# Patient Record
Sex: FEMALE | Race: WHITE | NOT HISPANIC OR LATINO | Employment: OTHER | URBAN - METROPOLITAN AREA
[De-identification: names, ages, dates, MRNs, and addresses within clinical notes are randomized per-mention and may not be internally consistent; named-entity substitution may affect disease eponyms.]

---

## 2021-07-27 ENCOUNTER — TELEPHONE (OUTPATIENT)
Dept: OTHER | Facility: OTHER | Age: 81
End: 2021-07-27

## 2021-07-28 ENCOUNTER — NURSING HOME VISIT (OUTPATIENT)
Dept: GERIATRICS | Facility: OTHER | Age: 81
End: 2021-07-28
Payer: COMMERCIAL

## 2021-07-28 DIAGNOSIS — I10 ESSENTIAL HYPERTENSION: ICD-10-CM

## 2021-07-28 DIAGNOSIS — M25.562 ARTHRALGIA OF LEFT KNEE: ICD-10-CM

## 2021-07-28 DIAGNOSIS — E03.9 ACQUIRED HYPOTHYROIDISM: ICD-10-CM

## 2021-07-28 DIAGNOSIS — L89.623 PRESSURE INJURY OF LEFT HEEL, STAGE 3 (HCC): ICD-10-CM

## 2021-07-28 DIAGNOSIS — D50.8 OTHER IRON DEFICIENCY ANEMIA: ICD-10-CM

## 2021-07-28 DIAGNOSIS — E44.0 PROTEIN-CALORIE MALNUTRITION, MODERATE (HCC): ICD-10-CM

## 2021-07-28 DIAGNOSIS — R26.2 AMBULATORY DYSFUNCTION: Primary | ICD-10-CM

## 2021-07-28 PROBLEM — D50.9 IRON DEFICIENCY ANEMIA: Status: ACTIVE | Noted: 2021-06-21

## 2021-07-28 PROBLEM — I21.4 NSTEMI (NON-ST ELEVATED MYOCARDIAL INFARCTION) (HCC): Status: ACTIVE | Noted: 2021-07-21

## 2021-07-28 PROBLEM — I50.20 HFREF (HEART FAILURE WITH REDUCED EJECTION FRACTION) (HCC): Status: ACTIVE | Noted: 2021-07-22

## 2021-07-28 PROBLEM — R74.01 ELEVATED AST (SGOT): Status: ACTIVE | Noted: 2021-07-21

## 2021-07-28 PROBLEM — Z13.220 LIPID SCREENING: Status: ACTIVE | Noted: 2018-06-07

## 2021-07-28 PROBLEM — I83.90 VARICOSE VEIN OF LEG: Status: ACTIVE | Noted: 2018-06-07

## 2021-07-28 PROBLEM — R77.8 ELEVATED TROPONIN: Status: ACTIVE | Noted: 2021-06-15

## 2021-07-28 PROBLEM — Z71.1 CONCERN ABOUT EAR DISEASE WITHOUT DIAGNOSIS: Status: ACTIVE | Noted: 2018-06-07

## 2021-07-28 PROBLEM — M25.50 ARTHRALGIA: Status: ACTIVE | Noted: 2018-06-07

## 2021-07-28 PROBLEM — I5A MYOCARDIAL INJURY: Status: ACTIVE | Noted: 2021-07-21

## 2021-07-28 PROBLEM — I31.3 PERICARDIAL EFFUSION: Status: ACTIVE | Noted: 2021-06-17

## 2021-07-28 PROBLEM — K21.9 GASTROESOPHAGEAL REFLUX DISEASE WITHOUT ESOPHAGITIS: Status: ACTIVE | Noted: 2018-06-07

## 2021-07-28 PROBLEM — M62.82 DISEASE CHARACTERIZED BY DESTRUCTION OF SKELETAL MUSCLE: Status: ACTIVE | Noted: 2021-07-21

## 2021-07-28 PROBLEM — D64.9 NORMOCYTIC ANEMIA: Status: ACTIVE | Noted: 2021-06-15

## 2021-07-28 PROBLEM — G93.41 ACUTE METABOLIC ENCEPHALOPATHY: Status: ACTIVE | Noted: 2021-07-26

## 2021-07-28 PROBLEM — E55.9 VITAMIN D DEFICIENCY: Status: ACTIVE | Noted: 2021-06-21

## 2021-07-28 PROBLEM — N17.9 ACUTE KIDNEY INJURY (HCC): Status: ACTIVE | Noted: 2021-06-21

## 2021-07-28 PROCEDURE — 99306 1ST NF CARE HIGH MDM 50: CPT | Performed by: FAMILY MEDICINE

## 2021-07-28 RX ORDER — PANTOPRAZOLE SODIUM 40 MG/1
TABLET, DELAYED RELEASE ORAL
COMMUNITY
Start: 2021-06-21

## 2021-07-28 RX ORDER — LEVOTHYROXINE SODIUM 0.05 MG/1
50 TABLET ORAL DAILY
COMMUNITY
Start: 2021-06-22

## 2021-07-28 RX ORDER — LOSARTAN POTASSIUM 50 MG/1
50 TABLET ORAL DAILY
COMMUNITY
Start: 2021-06-21

## 2021-07-28 RX ORDER — METOPROLOL SUCCINATE 25 MG/1
25 TABLET, EXTENDED RELEASE ORAL DAILY
COMMUNITY
Start: 2021-07-27

## 2021-07-28 RX ORDER — ASPIRIN 81 MG/1
81 TABLET, CHEWABLE ORAL DAILY
COMMUNITY
Start: 2021-07-28 | End: 2022-07-28

## 2021-07-28 NOTE — ASSESSMENT & PLAN NOTE
With recent fall and rhabdo, needs NH Care, ordered PT/OT to improve gait, transfer, endurance, ADLs

## 2021-07-28 NOTE — PROGRESS NOTES
[address]  [address]  Facility: [facility]  NAME: Carlos A Alba  AGE: 80 y o  SEX: female    DATE OF ENCOUNTER: 7/28/2021    Code status:  No CPR D/W patient and her daughter    Assessment and Plan     Ambulatory dysfunction  With recent fall and rhabdo, needs NH Care, ordered PT/OT to improve gait, transfer, endurance, ADLs  Arthralgia  Ordered Bengay, close monitoring, tylenol prn, muscle strengthening exercises  Iron deficiency anemia  On Iron, ordered CBC on 7/30/2021  Essential hypertension  Ordered las for 7/30  On Losartan  Will continue with monitoring  Zio patch in place to be remove and send to cardio on 8/6  Hypothyroidism  On Levothyroxine  Last TSH in June slightly at higher side  Will continue with monitoring  Protein-calorie malnutrition, moderate ([facility] [address] )  Will monitor closely  Pressure injury of left heel, stage 3 (HCC)  Mepliex to left heel  Wound solution to f/u pt in the facility  All medications and routine orders were reviewed and updated as needed  Plan discussed with: pt, her daughter and nursing staff     Chief Complaint     Left knee and shoulders pain    History of Present Illness   Pt with [facility] and medical problem as this note who was admitted to [facility] on 7/20 after she had a fall at home and was on floor for several hours till her daughter came back from work and found her  Pt had another admission to the hospital 6/15-6/21 for LINDSAY requiring 2 units PRBC transfusion, and pt refused GI w/u  Pt also was found with small pericardial effusion with tamponade physiology, conservative management was pursed and repeat echo did not show tamponade physiology  Pt also had VIPUL along with elevated Troponin in the setting of hypertensive urgency which improved, EKG was negative for ischemic changes  Pt had rhabdomyolysis and was treated with IV fluid  CK were initially 1357 and was rending down   Pt reports mostly left knee pain and some b/l shoulder and neck pain  ??? Lightheadedness  Pt is now at [facility] since yesterday for STR  Pt has Zio patch cardiac monitoring on  HISTORY:  Past Medical History:   Diagnosis Date    Gastroesophageal reflux disease without esophagitis     HTN (hypertension)     Hypothyroidism      Family History   Family history unknown: Yes     Social History     Socioeconomic History    Marital status: Unknown     Spouse name: None    Number of children: None    Years of education: None    Highest education level: None   Occupational History    None   Tobacco Use    Smoking status: Never Smoker    Smokeless tobacco: Never Used   Substance and Sexual Activity    Alcohol use: Never    Drug use: Never    Sexual activity: Not Currently   Other Topics Concern    None   Social History Narrative    None     Social Determinants of Health     Financial Resource Strain:     Difficulty of Paying Living Expenses:    Food Insecurity:     Worried About Running Out of Food in the Last Year:     Ran Out of Food in the Last Year:    Transportation Needs:     Lack of Transportation (Medical):  Lack of Transportation (Non-Medical):    Physical Activity:     Days of Exercise per Week:     Minutes of Exercise per Session:    Stress:     Feeling of Stress :    Social Connections:     Frequency of Communication with Friends and Family:     Frequency of Social Gatherings with Friends and Family:     Attends Orthodoxy Services:     Active Member of Clubs or Organizations:     Attends Club or Organization Meetings:     Marital Status:    Intimate Partner Violence:     Fear of Current or Ex-Partner:     Emotionally Abused:     Physically Abused:     Sexually Abused: Allergies:  No Known Allergies    Review of Systems     Review of Systems   Constitutional: Negative  HENT: Negative  Eyes: Negative  Respiratory: Negative  Cardiovascular: Negative      Gastrointestinal: Negative  Endocrine: Negative  Genitourinary: Negative  Musculoskeletal: Negative  Left knee pain, B/L shoulder/neck soreness  Skin: Negative  Allergic/Immunologic: Negative  Neurological: Negative  Hematological: Negative  Psychiatric/Behavioral: Negative  All other systems reviewed and are negative  Medications and orders     All medications reviewed and updated in Nursing Home EMR  Objective     Vitals: wt:114 5Ibs           BP:118/62          PA:70   RR:18      Afebrile     Physical Exam  Vitals and nursing note reviewed  Constitutional:       General: She is not in acute distress  Appearance: Normal appearance  She is well-developed  She is not ill-appearing, toxic-appearing or diaphoretic  HENT:      Head: Normocephalic and atraumatic  Right Ear: External ear normal       Left Ear: External ear normal       Nose: Nose normal  No congestion or rhinorrhea  Mouth/Throat:      Comments: Missing teeth   Eyes:      General: No scleral icterus  Right eye: No discharge  Left eye: No discharge  Conjunctiva/sclera: Conjunctivae normal       Pupils: Pupils are equal, round, and reactive to light  Cardiovascular:      Rate and Rhythm: Normal rate and regular rhythm  Heart sounds: Normal heart sounds  No murmur heard  No friction rub  No gallop  Pulmonary:      Effort: Pulmonary effort is normal  No respiratory distress  Breath sounds: Normal breath sounds  No stridor  No wheezing, rhonchi or rales  Chest:      Chest wall: No tenderness  Abdominal:      General: Abdomen is flat  Bowel sounds are normal  There is no distension  Palpations: Abdomen is soft  There is no mass  Tenderness: There is no abdominal tenderness  There is no guarding or rebound  Hernia: No hernia is present  Genitourinary:     Comments: Deferred  Musculoskeletal:         General: No swelling, tenderness or deformity   Normal range of motion  Cervical back: Normal range of motion and neck supple  No rigidity or tenderness  Right lower leg: No edema  Lymphadenopathy:      Cervical: No cervical adenopathy  Skin:     General: Skin is warm and dry  Coloration: Skin is not jaundiced or pale  Findings: Erythema (left post/lateral heel has open area with some drainage ) present  No bruising, lesion or rash  Comments: Didn't examine sacral area  Neurological:      Mental Status: She is alert  Cranial Nerves: Cranial nerve deficit present  Psychiatric:         Behavior: Behavior normal          Pertinent Laboratory/Diagnostic Studies: The following labs/studies were reviewed please see chart or hospital paperwork for details     Ref Range & Units 2 d ago Comments   SARS Coronavirus 2 PCR Not Detected  Not Detected       Ref Range & Units 2 d ago    Glucose 65 - 99 mg/dL 94        BUN 7 - 25 mg/dL 38High         Creatinine 0 40 - 1 10 mg/dL 0 86        Sodium 135 - 145 mmol/L 141        Potassium 3 5 - 5 2 mmol/L 4 6        Chloride 100 - 109 mmol/L 111High         Carbon Dioxide 23 - 31 mmol/L 24        Calcium 8 5 - 10 1 mg/dL 8 7        Anion Gap 3 - 11  6        eGFR, Non- >60  64        eGFR,  >60  74        eGFR Comment   Please refer to initial GFR, CALC footnote           Ref Range & Units 2 d ago   Hemoglobin 11 5 - 14 5 g/dL 9 6Low         Hematocrit 35 0 - 43 0 % 29 5Low         WBC 4 0 - 10 0 thou/cmm 3 9Low         RBC 3 70 - 4 70 mill/cmm 3 45Low         Platelet Count 144 - 350 thou/cmm 233        MPV 7 5 - 11 3 fL 8 5        MCV 80 - 100 fL 86        MCH 26 0 - 34 0 pg 27 7        MCHC 32 0 - 37 0 g/dL 32 3        RDW 12 0 - 16 0 % 35 6High         Resulting Agency  [facility] ([facility])   Specimen Collected: 07/26/21  3:25 AM Last Resulted: 07/26/21  4:26 AM   Received From: [facility] [address]  Result Received: 07/28/21  4:16 PM        Ref Range & Units 5 d ago Comments    Cholesterol <200 mg/dL 223High           Triglyceride <150 mg/dL 121          Cholesterol, HDL, Direct >40 mg/dL 59          Cholesterol, Non-HDL <160 mg/dL 164High   Note: For NCEP interpretive guidelines please refer to the Laboratory Handbook  Cholesterol, LDL, Calculated <130 mg/dL 140High   LDL Cholesterol was calculated using the Friedewald equation  Direct measurement of LDL is not indicated for this patient based on [facility]'s analytical algorithm for measurement of LDL Cholesterol  CHOL/HDL Ratio   3 78  Relative Risk   1/2 Average Risk          3 27   Average Risk              4 44   2X Average Risk           7 05   3X Average Risk          11 04      07/23/21 07/22/21 07/21/21 07/20/21   CK, Total 178 287High  804High  1357High      Formatting of this note might be different from the original    Left knee x-ray 3 view     History: Pain poor ambulation no provided history of trauma     Comparison: None     Findings: No acute fracture, dislocation or bony destructive pathology  Slight   chronic osteoarthritis left knee  The bones are moderately demineralized   osteopenia/osteoporosis  Mild hazy joint effusion patella bursa  ASVD left leg   incidentally noted  IMPRESSION:   IMPRESSION:       Slight chronic osteoarthritis left knee  Osteoporosis  Mild joint effusion  CT left lower extremity unenhanced     INDICATION: Hip pain initial exam     PROCEDURE: Helical sections through the left hip with multiplanar reformats     COMPARISON: CT 2 days earlier     FINDINGS: No fracture or signs of osteonecrosis or significant arthritis of the   hip  Satisfactory muscular volume for the patient's chronologic age  Mild   anasarca  Mild vascular calcification  Distended urinary bladder  No evidence of   hernia    Other Result Text    Jacky Vega MD - 07/23/2021   Formatting of this note might be different from the original    CT left lower extremity unenhanced INDICATION: Hip pain initial exam     PROCEDURE: Helical sections through the left hip with multiplanar reformats     COMPARISON: CT 2 days earlier     FINDINGS: No fracture or signs of osteonecrosis or significant arthritis of the   hip  Satisfactory muscular volume for the patient's chronologic age  Mild   anasarca  Mild vascular calcification  Distended urinary bladder  No evidence of   hernia  IMPRESSION:   IMPRESSION: Unremarkable left hip  CT examination performed with dose lowering protocol in accordance with ALARA  Ref Range & Units 6 d ago   CK, Total <201 U/L 287High             Ref Range & Units 7 d ago   CK, Total <201 U/L 804High        Ref Range & Units 7 d ago   Thyroid Stimulating Hormone 0 36 - 3 74 uIU/mL 1 42       Left Ventricle: Systolic function is moderately decreased with an   ejection fraction of 35-40%  Severe hypokinesis of the mid to distal   anteroseptum, inferoseptum, apical segments    Left Atrium: Left atrium cavity is mildly dilated    Right Ventricle: Right ventricle cavity is normal  Systolic function is   normal      Pericardium: There is a moderate circumferential pericardial effusion   anterior and posterior to the heart  There is no echocardiographic   evidence of tamponade  Ref Range & Units 7 d ago    Troponin I <0 05 ng/mL 5  71High Panic          Ref Range & Units 8 d ago   Troponin I <0 05 ng/mL 6  60High Panic      Ref Range & Units 8 d ago    Troponin I <0 05 ng/mL 3  02High Panic       CT chest, abdomen, and pelvis with contrast   Impression:   1  No acute posttraumatic injury in the chest, abdomen or pelvis is identified  2  Pericardial effusion  Trace pleural effusions  3  Colonic fecal loading suggests constipation  Additional findings as detailed   above  NO CHARGE CT LUMBAR SPINE    Impression    IMPRESSION:   1  Thoracolumbar kyphoscoliosis  2  Multiple age-indeterminate compression fractures  3  Spondylosis         NO CHARGE CT THORACIC SPINE    Impression    IMPRESSION:   1  Numerous age-indeterminate compression fractures  2  No bony retropulsion  3  Spondylosis 4  Kyphoscoliosis  CT HEAD WO CONTRAST    Impression    Impression:   1  Atrophy  2  Cerebral white matter disease - non-specific but probably due to age-related   and/or chronic small vessel ischemic disease  CT CERVICAL SPINE WO CONTRAST    Impression    Impression: 1  Spondylosis  2  No fracture or dislocation  CT examination performed with dose lowering protocol in accordance with ALARA  CT left lower extremity unenhanced    INDICATION: Hip pain initial exam    PROCEDURE: Helical sections through the left hip with multiplanar reformats    COMPARISON: CT 2 days earlier    FINDINGS: No fracture or signs of osteonecrosis or significant arthritis of the  hip  Satisfactory muscular volume for the patient's chronologic age  Mild  anasarca  Mild vascular calcification  Distended urinary bladder  No evidence of  hernia  IMPRESSION:  IMPRESSION: Unremarkable left hip             Cecile Malone MD  7/28/2021 6:09 PM

## 2021-07-28 NOTE — ASSESSMENT & PLAN NOTE
Ordered las for 7/30  On Losartan  Will continue with monitoring  Zio patch in place to be remove and send to cardio on 8/6

## 2021-08-05 ENCOUNTER — NURSING HOME VISIT (OUTPATIENT)
Dept: GERIATRICS | Facility: OTHER | Age: 81
End: 2021-08-05
Payer: COMMERCIAL

## 2021-08-05 DIAGNOSIS — M25.562 ARTHRALGIA OF LEFT KNEE: Primary | ICD-10-CM

## 2021-08-05 DIAGNOSIS — E03.9 ACQUIRED HYPOTHYROIDISM: ICD-10-CM

## 2021-08-05 DIAGNOSIS — I10 ESSENTIAL HYPERTENSION: ICD-10-CM

## 2021-08-05 DIAGNOSIS — R26.2 AMBULATORY DYSFUNCTION: ICD-10-CM

## 2021-08-05 DIAGNOSIS — L89.623 PRESSURE INJURY OF LEFT HEEL, STAGE 3 (HCC): ICD-10-CM

## 2021-08-05 PROCEDURE — 99316 NF DSCHRG MGMT 30 MIN+: CPT | Performed by: NURSE PRACTITIONER

## 2021-08-06 VITALS
SYSTOLIC BLOOD PRESSURE: 118 MMHG | RESPIRATION RATE: 18 BRPM | DIASTOLIC BLOOD PRESSURE: 72 MMHG | HEART RATE: 72 BPM | WEIGHT: 111 LBS | TEMPERATURE: 98.2 F

## 2021-08-06 NOTE — ASSESSMENT & PLAN NOTE
·  Controlled   · Continue with losartan, metoprolol  · Continue to monitor BP   · Follow-up with PCP

## 2021-08-06 NOTE — PROGRESS NOTES
[facility]  [address]   [phone]  [address] [place] [address]: [facility] SNF List: [facility]  POS: 31: SNF/Short Term Rehab    NAME: Carlos A Alba  AGE: 80 y o  XTH:6/2/2019 SEX: female KRT:[id number]  DATE OF ADMISSION: 7/27/21 DATE OF DISCHARGE: 8/6/21 DISCHARGE DISPOSITION: Stable    Reason for admission: Patient was admitted for rehabilitation after hospitalization for s/p MI  Course of stay: Patient received skilled nursing care, PT/OT/ST, dietary and  during their stay at [facility] with an overall improvement in functional status  PT/OT Report: All PT/OT goals met for a safe discharge to home  Pt to have VNA PT/OT in home    Discharge Medications: See discharge medication list which was reviewed and signed  Status at time of discharge: Stable    Today's Visit: 8/5/21    Subjective: 80y o  year old female seen and examined today for discharge planning  Patient is scheduled to be discharged on 8/6/21 to home with the following services: VNA, PT and OT  Patient states that they are feeling well and appear comfortable in the room  They deny headache, dizziness, blurred vision, dyspnea, abdominal pain, nausea, vomiting and diarrhea  Vitals:   Vitals:    08/06/21 1528   BP: 118/72   Pulse: 72   Resp: 18   Temp: 98 2 °F (36 8 °C)       Exam: Physical Exam  Vitals reviewed  Constitutional:       Appearance: Normal appearance  HENT:      Head: Normocephalic and atraumatic  Eyes:      Conjunctiva/sclera: Conjunctivae normal    Cardiovascular:      Rate and Rhythm: Normal rate and regular rhythm  Heart sounds: Normal heart sounds, S1 normal and S2 normal  No murmur heard  Pulmonary:      Effort: Pulmonary effort is normal  No respiratory distress  Breath sounds: Normal breath sounds  No wheezing  Abdominal:      General: Bowel sounds are normal  There is no distension  Palpations: Abdomen is soft  Tenderness: There is no abdominal tenderness  Musculoskeletal:         General: Normal range of motion  Cervical back: Normal range of motion  Comments: Generalized weakness   Skin:     General: Skin is warm and dry  Neurological:      Mental Status: She is alert  Psychiatric:         Attention and Perception: Attention normal          Mood and Affect: Mood normal          Speech: Speech normal          Behavior: Behavior normal          Thought Content: Thought content normal          Cognition and Memory: Cognition normal          Discussion with patient/family and further instructions:  -Fall precautions  -Aspiration precautions  -Bleeding precautions  -Monitor for signs/symptoms of infection  -Medication list was reviewed and signed  -DME form was completed    Follow-up Recommendations: Please follow-up with your primary care physician within 7-10 days of discharge to review medication changes and current status       Problem List Follow-up Recommendations:  Problem List Items Addressed This Visit        Endocrine    Hypothyroidism     · Doing well  · Continue with synthroid  · F/u with PCP            Cardiovascular and Mediastinum    Essential hypertension     ·  Controlled   · Continue with losartan, metoprolol  · Continue to monitor BP   · Follow-up with PCP            Musculoskeletal and Integument    Pressure injury of left heel, stage 3 (HCC)     ·  Slow healing   · Continue with current wound care   · Home care RN to help with wound care  · Follow-up with PCP            Other    Arthralgia - Primary     · Doing well, pain controlled   · Continue with Tylenol p r n  and Smith-Cha   · PT/ OT in-home         Ambulatory dysfunction     ·  Doing well at short-term rehab  · Continue with restorative care  · Continue to assist with ADL and I ADL  · Fall precautions   · PT/ OT in-home               JOSE Burgos  8/6/20213:29 PM    Discharge  Statement   I spent 45 minutes minutes discharging the patient  This time was spent on the day of discharge  I had direct contact with the patient on the day of discharge

## 2021-08-06 NOTE — ASSESSMENT & PLAN NOTE
·  Slow healing   · Continue with current wound care   · Home care RN to help with wound care  · Follow-up with PCP

## 2021-08-06 NOTE — ASSESSMENT & PLAN NOTE
·  Doing well at short-term rehab  · Continue with restorative care  · Continue to assist with ADL and I ADL  · Fall precautions   · PT/ OT in-home